# Patient Record
Sex: MALE
[De-identification: names, ages, dates, MRNs, and addresses within clinical notes are randomized per-mention and may not be internally consistent; named-entity substitution may affect disease eponyms.]

---

## 2023-04-18 PROBLEM — Z00.00 ENCOUNTER FOR PREVENTIVE HEALTH EXAMINATION: Status: ACTIVE | Noted: 2023-04-18

## 2023-04-20 ENCOUNTER — APPOINTMENT (OUTPATIENT)
Dept: OTOLARYNGOLOGY | Facility: CLINIC | Age: 63
End: 2023-04-20
Payer: COMMERCIAL

## 2023-04-20 PROCEDURE — 99204 OFFICE O/P NEW MOD 45 MIN: CPT | Mod: 25

## 2023-04-20 PROCEDURE — 31231 NASAL ENDOSCOPY DX: CPT | Mod: 52

## 2023-04-20 RX ORDER — BUDESONIDE 0.5 MG/2ML
0.5 INHALANT ORAL
Qty: 2 | Refills: 3 | Status: ACTIVE | COMMUNITY
Start: 2023-04-20 | End: 1900-01-01

## 2023-04-20 NOTE — PROCEDURE
[FreeTextEntry3] : Nasal Endoscopy:\par -left septal deflection\par -frontoethmoid polyposis, left>right; extension of left into olfactory recess\par -thick mucoid debris draining from left antrum down choana, suctioned w relief\par -bulbous MT and very narrow middle meati s/p ESS, unable to see into ethmoids\par -no gross mucopus

## 2023-04-20 NOTE — CONSULT LETTER
[Dear  ___] : Dear  [unfilled], [Courtesy Letter:] : I had the pleasure of seeing your patient, [unfilled], in my office today. [Consult Closing:] : Thank you very much for allowing me to participate in the care of this patient.  If you have any questions, please do not hesitate to contact me. [Sincerely,] : Sincerely, [FreeTextEntry3] : Asher Najera MD\par Department of Otolaryngology - Head and Neck Surgery\par [facility]

## 2023-04-20 NOTE — HISTORY OF PRESENT ILLNESS
[de-identified] : 62M here for initial evaluation.\par \par He c/o left>right nasal congestion and facial pressure.\par He has long standing h/o recurrent sinus infections and polyps. He underwent ESS 5 years ago and did great for about 3yrs thereafter. Since then, his sx have slowly been returning, which include congestion, yellow mucus, postnasal drip, headache and facial/frontal pressure. He has had no less than two severe sinus infections in the past 2 years and has been on steroids twice in the past 4 months for increased sx. \par Around 1 month ago he developed above sx, but without the green/yellow drainage; at that time he was given medrol which improved sx, but not completely, and though better, they remain and he is here for evaluation. His local ENT mentioned dupixent.\par There is no asthma. He has allergies and had shots many years ago. He takes flonase.\par No recent imaging.\par \par ROS otherwise unremarkable.

## 2023-04-20 NOTE — ASSESSMENT
[FreeTextEntry1] : 62M here for initial evaluation. He c/o left>right nasal congestion and facial pressure.\par He has long standing h/o chronic sinusitis and polyps. He underwent ESS 5 years ago and did great for about 3yrs thereafter. Since then, his sx have slowly been returning, which include congestion, yellow mucus, postnasal drip, headache and facial/frontal pressure. He has had no less than two severe sinus infections in the past 2 years and has been on steroids twice in the past 4 months for increased sx.  Around 1 month ago he developed above sx, but without the green/yellow drainage; at that time he was given medrol which improved sx, but not completely, and though better, they remain and he is here for evaluation. His local ENT mentioned dupixent.\par There is no asthma. He has allergies and had shots many years ago. He takes flonase. On exam, nasal endoscopy shows frontoethmoid polyposis (left>right) woth extension of polyps into left into olfactory recess; thick mucoid debris draining from left antrum down the choana was suctioned w relief. The middle turbinates are bulbous with very narrow middle meati s/p ESS; I am unable to see into ethmoids.\par He has chronic pansinusitis with polyposis with a moderate polyp burden (left>right). Though he had surgery 5 yrs ago, his sx have been worsening over the past 2 years and impact quality of his life. He is not currently on daily nasal regimen, so will start budesonide rinses BID and explained how to do it. He will get CT sinus for baseline in a few weeks and RTO for review and discussion of plan. All questions answered.

## 2023-05-09 ENCOUNTER — APPOINTMENT (OUTPATIENT)
Dept: CT IMAGING | Facility: CLINIC | Age: 63
End: 2023-05-09
Payer: COMMERCIAL

## 2023-05-09 PROCEDURE — 70486 CT MAXILLOFACIAL W/O DYE: CPT

## 2023-05-18 ENCOUNTER — APPOINTMENT (OUTPATIENT)
Dept: OTOLARYNGOLOGY | Facility: CLINIC | Age: 63
End: 2023-05-18
Payer: COMMERCIAL

## 2023-05-18 DIAGNOSIS — J33.9 NASAL POLYP, UNSPECIFIED: ICD-10-CM

## 2023-05-18 DIAGNOSIS — J32.4 CHRONIC PANSINUSITIS: ICD-10-CM

## 2023-05-18 PROCEDURE — 31231 NASAL ENDOSCOPY DX: CPT | Mod: 52

## 2023-05-18 PROCEDURE — 99213 OFFICE O/P EST LOW 20 MIN: CPT | Mod: 25

## 2023-05-18 RX ORDER — FLUTICASONE PROPIONATE 93 UG/1
93 SPRAY, METERED NASAL
Qty: 16 | Refills: 2 | Status: ACTIVE | COMMUNITY
Start: 2023-05-18 | End: 1900-01-01

## 2023-05-18 NOTE — HISTORY OF PRESENT ILLNESS
[de-identified] : 62M here in followup.\par \par He c/o left>right nasal congestion and facial pressure. Since last seen he tried the budesonide rinses but sometimes feels they go into his ears when he rinses.\par He has long standing h/o recurrent sinus infections and polyps. He underwent ESS 5 years ago and did great for about 3yrs thereafter. Since then, his sx have slowly been returning, which include congestion, yellow mucus, postnasal drip, headache and facial/frontal pressure. He has had no less than two severe sinus infections in the past 2 years and has been on steroids twice in the past 4 months for increased sx. \par Around 2 month ago he developed above sx, but without the green/yellow drainage; at that time he was given medrol which improved sx, but not completely, and though better, they remain and he is here for evaluation. His local ENT mentioned dupixent.\par There is no asthma. He has allergies and had shots many years ago. He takes flonase.\par \par CT Sinus 5/9/23 (I reviewed images):\par -e/o prior limited sinus surgery w severe left and moderate right sided pansinus mucosal thickening and diffusely osteitic bone\par -multiple residual ethmoid partitions/cells (left>right), long and obstructive MTs w lamellar cells, residual left uncinate and terrence cell\par -air fluid levels b/l maxillaries\par \par ROS otherwise unremarkable.

## 2023-05-18 NOTE — CONSULT LETTER
[Dear  ___] : Dear  [unfilled], [Courtesy Letter:] : I had the pleasure of seeing your patient, [unfilled], in my office today. [Consult Closing:] : Thank you very much for allowing me to participate in the care of this patient.  If you have any questions, please do not hesitate to contact me. [Sincerely,] : Sincerely, [FreeTextEntry3] : Asher Najera MD\par Department of Otolaryngology - Head and Neck Surgery\par [facility]

## 2023-05-18 NOTE — PROCEDURE
[FreeTextEntry3] : Nasal Endoscopy:\par -left septal deflection\par -frontoethmoid polyposis, left>right; extension of left into olfactory recess\par -thick mucoid debris draining from left antrum down choana\par -bulbous MT and very narrow middle meati s/p ESS, obstructive left sided synechiae,\par -mucoid debris in right antrum

## 2023-05-18 NOTE — ASSESSMENT
[FreeTextEntry1] : 62M here in followup. Since last seen he tried the budesonide rinses but sometimes feels they go into his ears when he rinses. He c/o left>right nasal congestion and facial pressure.\par He has long standing h/o chronic sinusitis and polyps. He underwent ESS 5 years ago and did great for about 3yrs thereafter. Since then, his sx have slowly been returning, which include congestion, yellow mucus, postnasal drip, headache and facial/frontal pressure. He has had no less than two severe sinus infections in the past 2 years and has been on steroids twice in the past 4 months for increased sx.  Around 1 month ago he developed above sx, but without the green/yellow drainage; at that time he was given medrol which improved sx, but not completely, and though better, they remain and he is here for evaluation. His local ENT mentioned dupixent.\par There is no asthma. He has allergies and had shots many years ago. CT sinus shows e/o prior limited sinus surgery w severe left and moderate right sided pansinus mucosal thickening and diffusely osteitic bone; there are\par multiple residual ethmoid partitions and cells (left>right), long and obstructive middle turbinates w lamellar cells and a residual left uncinate and terrence cell and bilateral maxillary air-fluid levels.\par On exam, nasal endoscopy shows frontoethmoid polyposis (left>right) with extension of polyps into left into olfactory recess. The middle turbinates are bulbous with very narrow middle meati s/p ESS w obstructive left polyposis and synechiae and mucoid debris layering in the right antrum.\par He has chronic pansinusitis with polyposis with a moderate to severe inflammatory polyp burden (left>right) on both imaging and endoscopy. Though he had surgery 5 yrs ago, his sx have been worsening over the past 2 years and impact quality of his life. He tried the budesonide rinses but feel they get into his ear - likely due to resistance from obstructive MTs - so will switch to xhance for topical regimen.\par As for definitive management, I think the next step in him would be a revision, but complete, sinus surgery, which he has not yet had. This will maximize nasal airway and paranasal sinus patency and optimize delivery of topical medication which is the gold standard in treatment. I would not recommend a biologic since he simply has not had complete sinus surgery, as detailed above. This was discussed in detail and his imaging was also reviewed at length and all questions answered. He will keep me posted about proceeding.

## 2023-05-18 NOTE — DATA REVIEWED
[de-identified] : CT Sinus 5/9/23:\par FINDINGS:\par FRONTAL SINUSES: Marked mucosal disease on the left with foamy secretions. Mild mucosal thickening on the right. A large intersinus septal cell drains into the right frontal recess.\par ETHMOID SINUSES: Marked mucosal disease on the left. Mild to moderate mucosal disease on the right.\par MAXILLARY SINUSES: Marked mucosal disease on the left. Mild mucosal thickening on the right. Bilateral foamy secretions.\par SPHENOID SINUSES: Moderate to marked mucosal disease on the left. Mild mucosal thickening on the right.\par NASAL SEPTUM: Deviated to the left\par NASAL CAVITY/NASOPHARYNX: Mucosal thickening versus polypoid soft tissue partially obstructs the middle meatus on both sides.\par POSTOP CHANGES: Status post bilateral ethmoidectomies, uncinectomies/antrostomies and partial middle turbinectomies. Status post left sphenoidotomy.\par RIGHT OSTIOMEATAL UNIT: Abnormal soft tissue partially obstructs the physiologic infundibulum. The antrostomy channel is patent.\par LEFT OSTIOMEATAL UNIT: Abnormal soft tissue obstructs the infundibulum and antrostomy channel\par SPHENOETHMOIDAL RECESSES: Obstructed by abnormal soft tissue on the left. The right sphenoid sinus ostium is obstructed by abnormal soft tissue.\par RIGHT FRONTAL RECESS: Patent. Bordered by abnormal soft tissue.\par LEFT FRONTAL RECESS: Obstructed by abnormal soft tissue\par BONES: There is focal dehiscence of the right cribriform plate ([id number]). 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

## 2025-07-29 ENCOUNTER — APPOINTMENT (OUTPATIENT)
Dept: OTOLARYNGOLOGY | Facility: CLINIC | Age: 65
End: 2025-07-29
Payer: COMMERCIAL

## 2025-07-29 ENCOUNTER — NON-APPOINTMENT (OUTPATIENT)
Age: 65
End: 2025-07-29

## 2025-07-29 DIAGNOSIS — J33.9 NASAL POLYP, UNSPECIFIED: ICD-10-CM

## 2025-07-29 DIAGNOSIS — J32.4 CHRONIC PANSINUSITIS: ICD-10-CM

## 2025-07-29 PROCEDURE — 31231 NASAL ENDOSCOPY DX: CPT

## 2025-07-29 PROCEDURE — 99214 OFFICE O/P EST MOD 30 MIN: CPT | Mod: 25

## 2025-07-29 RX ORDER — FLUTICASONE PROPIONATE 93 UG/1
93 SPRAY, METERED NASAL
Qty: 1 | Refills: 2 | Status: ACTIVE | COMMUNITY
Start: 2025-07-29 | End: 1900-01-01

## 2025-07-29 RX ORDER — DOXYCYCLINE 100 MG/1
100 TABLET, FILM COATED ORAL
Qty: 28 | Refills: 0 | Status: ACTIVE | COMMUNITY
Start: 2025-07-29 | End: 1900-01-01

## 2025-07-29 RX ORDER — PREDNISONE 10 MG/1
10 TABLET ORAL
Qty: 38 | Refills: 0 | Status: ACTIVE | COMMUNITY
Start: 2025-07-29 | End: 1900-01-01

## 2025-08-04 LAB — EAR NOSE AND THROAT CULTURE: NORMAL

## 2025-09-09 ENCOUNTER — APPOINTMENT (OUTPATIENT)
Dept: OTOLARYNGOLOGY | Facility: CLINIC | Age: 65
End: 2025-09-09
Payer: COMMERCIAL

## 2025-09-09 DIAGNOSIS — J32.4 CHRONIC PANSINUSITIS: ICD-10-CM

## 2025-09-09 DIAGNOSIS — J33.9 NASAL POLYP, UNSPECIFIED: ICD-10-CM

## 2025-09-09 PROCEDURE — 31231 NASAL ENDOSCOPY DX: CPT

## 2025-09-09 PROCEDURE — 99213 OFFICE O/P EST LOW 20 MIN: CPT | Mod: 25
